# Patient Record
Sex: MALE | Race: WHITE
[De-identification: names, ages, dates, MRNs, and addresses within clinical notes are randomized per-mention and may not be internally consistent; named-entity substitution may affect disease eponyms.]

---

## 2017-11-08 PROBLEM — Z00.00 ENCOUNTER FOR PREVENTIVE HEALTH EXAMINATION: Status: ACTIVE | Noted: 2017-11-08

## 2017-11-09 ENCOUNTER — APPOINTMENT (OUTPATIENT)
Dept: RADIOLOGY | Facility: HOSPITAL | Age: 77
End: 2017-11-09
Payer: MEDICARE

## 2017-11-09 ENCOUNTER — OUTPATIENT (OUTPATIENT)
Dept: OUTPATIENT SERVICES | Facility: HOSPITAL | Age: 77
LOS: 1 days | End: 2017-11-09
Payer: MEDICARE

## 2017-11-09 DIAGNOSIS — K59.00 CONSTIPATION, UNSPECIFIED: ICD-10-CM

## 2017-11-09 PROCEDURE — 74000: CPT | Mod: 26

## 2017-11-09 PROCEDURE — 74018 RADEX ABDOMEN 1 VIEW: CPT

## 2018-05-03 ENCOUNTER — OUTPATIENT (OUTPATIENT)
Dept: OUTPATIENT SERVICES | Facility: HOSPITAL | Age: 78
LOS: 1 days | End: 2018-05-03

## 2018-05-03 ENCOUNTER — APPOINTMENT (OUTPATIENT)
Dept: RADIOLOGY | Facility: HOSPITAL | Age: 78
End: 2018-05-03
Payer: MEDICARE

## 2018-05-03 DIAGNOSIS — R13.10 DYSPHAGIA, UNSPECIFIED: ICD-10-CM

## 2018-05-03 PROCEDURE — 74241: CPT | Mod: 26

## 2018-10-10 ENCOUNTER — INPATIENT (INPATIENT)
Facility: HOSPITAL | Age: 78
LOS: 4 days | Discharge: NURSING, REHAB OR LONG-TERM CARE | End: 2018-10-15
Attending: HOSPITALIST | Admitting: HOSPITALIST
Payer: MEDICARE

## 2018-10-10 VITALS
SYSTOLIC BLOOD PRESSURE: 99 MMHG | RESPIRATION RATE: 16 BRPM | HEART RATE: 65 BPM | TEMPERATURE: 98 F | OXYGEN SATURATION: 100 % | DIASTOLIC BLOOD PRESSURE: 61 MMHG

## 2018-10-10 DIAGNOSIS — K63.9 DISEASE OF INTESTINE, UNSPECIFIED: Chronic | ICD-10-CM

## 2018-10-10 DIAGNOSIS — I95.9 HYPOTENSION, UNSPECIFIED: ICD-10-CM

## 2018-10-10 DIAGNOSIS — S06.5X9A TRAUMATIC SUBDURAL HEMORRHAGE WITH LOSS OF CONSCIOUSNESS OF UNSPECIFIED DURATION, INITIAL ENCOUNTER: ICD-10-CM

## 2018-10-10 DIAGNOSIS — M19.90 UNSPECIFIED OSTEOARTHRITIS, UNSPECIFIED SITE: ICD-10-CM

## 2018-10-10 DIAGNOSIS — Z29.9 ENCOUNTER FOR PROPHYLACTIC MEASURES, UNSPECIFIED: ICD-10-CM

## 2018-10-10 DIAGNOSIS — D72.829 ELEVATED WHITE BLOOD CELL COUNT, UNSPECIFIED: ICD-10-CM

## 2018-10-10 LAB
ALBUMIN SERPL ELPH-MCNC: 3.3 G/DL — SIGNIFICANT CHANGE UP (ref 3.3–5)
ALP SERPL-CCNC: 63 U/L — SIGNIFICANT CHANGE UP (ref 40–120)
ALT FLD-CCNC: 19 U/L — SIGNIFICANT CHANGE UP (ref 4–41)
APPEARANCE UR: CLEAR — SIGNIFICANT CHANGE UP
APTT BLD: 25.2 SEC — LOW (ref 27.5–37.4)
AST SERPL-CCNC: 19 U/L — SIGNIFICANT CHANGE UP (ref 4–40)
BACTERIA # UR AUTO: NEGATIVE — SIGNIFICANT CHANGE UP
BASE EXCESS BLDV CALC-SCNC: 3.6 MMOL/L — SIGNIFICANT CHANGE UP
BASOPHILS # BLD AUTO: 0.01 K/UL — SIGNIFICANT CHANGE UP (ref 0–0.2)
BASOPHILS NFR BLD AUTO: 0.1 % — SIGNIFICANT CHANGE UP (ref 0–2)
BILIRUB SERPL-MCNC: 0.6 MG/DL — SIGNIFICANT CHANGE UP (ref 0.2–1.2)
BILIRUB UR-MCNC: NEGATIVE — SIGNIFICANT CHANGE UP
BLOOD GAS VENOUS - CREATININE: 0.87 MG/DL — SIGNIFICANT CHANGE UP (ref 0.5–1.3)
BLOOD UR QL VISUAL: NEGATIVE — SIGNIFICANT CHANGE UP
BUN SERPL-MCNC: 48 MG/DL — HIGH (ref 7–23)
CALCIUM SERPL-MCNC: 8.4 MG/DL — SIGNIFICANT CHANGE UP (ref 8.4–10.5)
CHLORIDE BLDV-SCNC: 105 MMOL/L — SIGNIFICANT CHANGE UP (ref 96–108)
CHLORIDE SERPL-SCNC: 99 MMOL/L — SIGNIFICANT CHANGE UP (ref 98–107)
CO2 SERPL-SCNC: 25 MMOL/L — SIGNIFICANT CHANGE UP (ref 22–31)
COLOR SPEC: YELLOW — SIGNIFICANT CHANGE UP
CREAT SERPL-MCNC: 0.73 MG/DL — SIGNIFICANT CHANGE UP (ref 0.5–1.3)
EOSINOPHIL # BLD AUTO: 0.01 K/UL — SIGNIFICANT CHANGE UP (ref 0–0.5)
EOSINOPHIL NFR BLD AUTO: 0.1 % — SIGNIFICANT CHANGE UP (ref 0–6)
GAS PNL BLDV: 133 MMOL/L — LOW (ref 136–146)
GLUCOSE BLDV-MCNC: 108 — HIGH (ref 70–99)
GLUCOSE SERPL-MCNC: 108 MG/DL — HIGH (ref 70–99)
GLUCOSE UR-MCNC: NEGATIVE — SIGNIFICANT CHANGE UP
HCO3 BLDV-SCNC: 27 MMOL/L — SIGNIFICANT CHANGE UP (ref 20–27)
HCT VFR BLD CALC: 32.1 % — LOW (ref 39–50)
HCT VFR BLDV CALC: 34.8 % — LOW (ref 39–51)
HGB BLD-MCNC: 10.8 G/DL — LOW (ref 13–17)
HGB BLDV-MCNC: 11.3 G/DL — LOW (ref 13–17)
HYALINE CASTS # UR AUTO: NEGATIVE — SIGNIFICANT CHANGE UP
IMM GRANULOCYTES # BLD AUTO: 0.07 # — SIGNIFICANT CHANGE UP
IMM GRANULOCYTES NFR BLD AUTO: 0.7 % — SIGNIFICANT CHANGE UP (ref 0–1.5)
INR BLD: 1.03 — SIGNIFICANT CHANGE UP (ref 0.88–1.17)
KETONES UR-MCNC: SIGNIFICANT CHANGE UP
LACTATE BLDV-MCNC: 1.8 MMOL/L — SIGNIFICANT CHANGE UP (ref 0.5–2)
LEUKOCYTE ESTERASE UR-ACNC: NEGATIVE — SIGNIFICANT CHANGE UP
LYMPHOCYTES # BLD AUTO: 1.14 K/UL — SIGNIFICANT CHANGE UP (ref 1–3.3)
LYMPHOCYTES # BLD AUTO: 10.8 % — LOW (ref 13–44)
MCHC RBC-ENTMCNC: 32.8 PG — SIGNIFICANT CHANGE UP (ref 27–34)
MCHC RBC-ENTMCNC: 33.6 % — SIGNIFICANT CHANGE UP (ref 32–36)
MCV RBC AUTO: 97.6 FL — SIGNIFICANT CHANGE UP (ref 80–100)
MONOCYTES # BLD AUTO: 1.21 K/UL — HIGH (ref 0–0.9)
MONOCYTES NFR BLD AUTO: 11.5 % — SIGNIFICANT CHANGE UP (ref 2–14)
NEUTROPHILS # BLD AUTO: 8.08 K/UL — HIGH (ref 1.8–7.4)
NEUTROPHILS NFR BLD AUTO: 76.8 % — SIGNIFICANT CHANGE UP (ref 43–77)
NITRITE UR-MCNC: NEGATIVE — SIGNIFICANT CHANGE UP
NRBC # FLD: 0 — SIGNIFICANT CHANGE UP
PCO2 BLDV: 39 MMHG — LOW (ref 41–51)
PH BLDV: 7.46 PH — HIGH (ref 7.32–7.43)
PH UR: 6 — SIGNIFICANT CHANGE UP (ref 5–8)
PLATELET # BLD AUTO: 287 K/UL — SIGNIFICANT CHANGE UP (ref 150–400)
PMV BLD: 8.8 FL — SIGNIFICANT CHANGE UP (ref 7–13)
PO2 BLDV: 41 MMHG — HIGH (ref 35–40)
POTASSIUM BLDV-SCNC: 3.6 MMOL/L — SIGNIFICANT CHANGE UP (ref 3.4–4.5)
POTASSIUM SERPL-MCNC: 3.7 MMOL/L — SIGNIFICANT CHANGE UP (ref 3.5–5.3)
POTASSIUM SERPL-SCNC: 3.7 MMOL/L — SIGNIFICANT CHANGE UP (ref 3.5–5.3)
PROT SERPL-MCNC: 6.7 G/DL — SIGNIFICANT CHANGE UP (ref 6–8.3)
PROT UR-MCNC: 30 — SIGNIFICANT CHANGE UP
PROTHROM AB SERPL-ACNC: 11.4 SEC — SIGNIFICANT CHANGE UP (ref 9.8–13.1)
RBC # BLD: 3.29 M/UL — LOW (ref 4.2–5.8)
RBC # FLD: 13.6 % — SIGNIFICANT CHANGE UP (ref 10.3–14.5)
RBC CASTS # UR COMP ASSIST: SIGNIFICANT CHANGE UP (ref 0–?)
SAO2 % BLDV: 75 % — SIGNIFICANT CHANGE UP (ref 60–85)
SODIUM SERPL-SCNC: 135 MMOL/L — SIGNIFICANT CHANGE UP (ref 135–145)
SP GR SPEC: 1.04 — SIGNIFICANT CHANGE UP (ref 1–1.04)
SQUAMOUS # UR AUTO: SIGNIFICANT CHANGE UP
TROPONIN T, HIGH SENSITIVITY: 13 NG/L — SIGNIFICANT CHANGE UP (ref ?–14)
TROPONIN T, HIGH SENSITIVITY: 15 NG/L — SIGNIFICANT CHANGE UP (ref ?–14)
UROBILINOGEN FLD QL: SIGNIFICANT CHANGE UP
WBC # BLD: 10.52 K/UL — HIGH (ref 3.8–10.5)
WBC # FLD AUTO: 10.52 K/UL — HIGH (ref 3.8–10.5)
WBC UR QL: SIGNIFICANT CHANGE UP (ref 0–?)

## 2018-10-10 PROCEDURE — 70450 CT HEAD/BRAIN W/O DYE: CPT | Mod: 26

## 2018-10-10 PROCEDURE — 70450 CT HEAD/BRAIN W/O DYE: CPT | Mod: 26,77

## 2018-10-10 PROCEDURE — 72170 X-RAY EXAM OF PELVIS: CPT | Mod: 26

## 2018-10-10 PROCEDURE — 71046 X-RAY EXAM CHEST 2 VIEWS: CPT | Mod: 26

## 2018-10-10 PROCEDURE — 99223 1ST HOSP IP/OBS HIGH 75: CPT | Mod: AI

## 2018-10-10 PROCEDURE — 99223 1ST HOSP IP/OBS HIGH 75: CPT

## 2018-10-10 PROCEDURE — 99233 SBSQ HOSP IP/OBS HIGH 50: CPT

## 2018-10-10 RX ORDER — SODIUM CHLORIDE 9 MG/ML
1000 INJECTION INTRAMUSCULAR; INTRAVENOUS; SUBCUTANEOUS ONCE
Qty: 0 | Refills: 0 | Status: COMPLETED | OUTPATIENT
Start: 2018-10-10 | End: 2018-10-10

## 2018-10-10 RX ORDER — ACETAMINOPHEN 500 MG
2 TABLET ORAL
Qty: 0 | Refills: 0 | COMMUNITY

## 2018-10-10 RX ORDER — DOCUSATE SODIUM 100 MG
1 CAPSULE ORAL
Qty: 0 | Refills: 0 | COMMUNITY

## 2018-10-10 RX ORDER — INFLUENZA VIRUS VACCINE 15; 15; 15; 15 UG/.5ML; UG/.5ML; UG/.5ML; UG/.5ML
0.5 SUSPENSION INTRAMUSCULAR ONCE
Qty: 0 | Refills: 0 | Status: DISCONTINUED | OUTPATIENT
Start: 2018-10-10 | End: 2018-10-15

## 2018-10-10 RX ORDER — ACETAMINOPHEN 500 MG
650 TABLET ORAL EVERY 6 HOURS
Qty: 0 | Refills: 0 | Status: DISCONTINUED | OUTPATIENT
Start: 2018-10-10 | End: 2018-10-15

## 2018-10-10 RX ORDER — ALBUTEROL 90 UG/1
2 AEROSOL, METERED ORAL
Qty: 0 | Refills: 0 | COMMUNITY

## 2018-10-10 RX ADMIN — SODIUM CHLORIDE 1000 MILLILITER(S): 9 INJECTION INTRAMUSCULAR; INTRAVENOUS; SUBCUTANEOUS at 14:40

## 2018-10-10 RX ADMIN — SODIUM CHLORIDE 2000 MILLILITER(S): 9 INJECTION INTRAMUSCULAR; INTRAVENOUS; SUBCUTANEOUS at 11:19

## 2018-10-10 RX ADMIN — SODIUM CHLORIDE 1000 MILLILITER(S): 9 INJECTION INTRAMUSCULAR; INTRAVENOUS; SUBCUTANEOUS at 15:34

## 2018-10-10 NOTE — H&P ADULT - PROBLEM SELECTOR PLAN 1
CT with Acute subdural hematomas in posterior hemispheric region as well as right frontal region  Appreciate NeuroSurgery- no acute neurosurgical intervention  Advise  repeat CT head in 6 hours  continue neuro checks CT with Acute subdural hematomas in posterior hemispheric region as well as right frontal region  INR - 1.03, Platelets - 287   Appreciate NeuroSurgery- no acute neurosurgical intervention  Advise  repeat CT head in 6 hours  continue neuro checks  EKG reviewed- presence of interventricular conduction delay  EP consulted - DW EP NP - FU recommendations  Will obtain TTE

## 2018-10-10 NOTE — CONSULT NOTE ADULT - SUBJECTIVE AND OBJECTIVE BOX
HPI/CC: Asked to see this patient for abnormal ECG.  This is a 77 yo male with a PMH of arthritis per chart, on PRN albuterol who presents today for recurrent mechanical falls, reportedly per EMR notes.  The patient has somewhat of a flat affect and offers minimal detail to his fall, yet reports having come to the ED for "pain in the knees and legs".  He otherwise is able to provide his name, , year, and location.  He reports having a "cousin who lives in [place], and a roommate -- he's very nice".  He denies CP, dizziness, or syncope.    Per Neurosurgery, no surgical intervention for small SDH found on head CT.  Mild leukocytosis found on CBC; likely reactive per primary team.   ECG demonstrates NSR with an IVCD and QRS of 124ms. There are no other bradyarrhythmias on telemetry review.    PAST MEDICAL & SURGICAL HISTORY:  Arthritis  Disease of intestine, unspecified    PREVIOUS DIAGNOSTIC TESTING:    [ ] Echocardiogram:  [ ] Catheterization:  [ ] Stress Test:  	    MEDICATIONS:  acetaminophen   Tablet .. 650 milliGRAM(s) Oral every 6 hours PRN    FAMILY HISTORY:  No pertinent family history in first degree relatives    SOCIAL HISTORY:    [ ] Non-smoker  [ ] Smoker  [ ] Alcohol    Allergies  No Known Allergies    REVIEW OF SYSTEMS:  CONSTITUTIONAL: No fever, weight loss, or fatigue  EYES: No eye pain, visual disturbances, or discharge  ENMT:  No difficulty hearing, tinnitus, vertigo; No sinus or throat pain  NECK: No pain or stiffness  RESPIRATORY: No cough, wheezing, chills or hemoptysis; No Shortness of Breath  CARDIOVASCULAR: No chest pain, palpitations, passing out, dizziness, or leg swelling  GASTROINTESTINAL: No abdominal or epigastric pain. No nausea, vomiting, or hematemesis; No diarrhea or constipation. No melena or hematochezia.  GENITOURINARY: No dysuria, frequency, hematuria, or incontinence  NEUROLOGICAL: No headaches, memory loss, loss of strength, numbness, or tremors  SKIN: No itching, burning, rashes, or lesions   LYMPH Nodes: No enlarged glands  ENDOCRINE: No heat or cold intolerance; No hair loss  MUSCULOSKELETAL: +joint pain "knees and legs"; No muscle, back   PSYCHIATRIC: No depression, anxiety, mood swings, or difficulty sleeping  HEME/LYMPH: No easy bruising, or bleeding gums  ALLERGY AND IMMUNOLOGIC: No hives or eczema	    [ ] All others negative	  [ ] Unable to obtain    PHYSICAL EXAM:  T(C): 36.7 (10-10-18 @ 15:40), Max: 37.6 (10-10-18 @ 10:50)  HR: 76 (10-10-18 @ 15:40) (65 - 76)  BP: 95/60 (10-10-18 @ 15:40) (84/55 - 99/61)  RR: 18 (10-10-18 @ 15:40) (16 - 18)  SpO2: 100% (10-10-18 @ 15:40) (100% - 100%)  Wt(kg): --  I&O's Summary    Appearance: Flat affect, cachectic	  HEENT:   Normal oral mucosa, PERRL, EOMI	  Lymphatic: No lymphadenopathy  Cardiovascular: Normal S1 S2, No JVD, No murmurs, No edema  Respiratory: Lungs clear to auscultation	  Psychiatry: A & O x 3, Mood & affect flat  Gastrointestinal:  Soft, Non-tender, + BS	  Skin: No rashes, No ecchymoses, No cyanosis; Rt head lac	  Neurologic: Non-focal  Extremities: Normal range of motion, No clubbing, cyanosis or edema  Vascular: Peripheral pulses palpable 2+ bilaterally    TELEMETRY: NSR 70	    ECG: NSR 80 bpm, LAD, IVCD,  ms, QTc 470ms  RADIOLOGY: Head CT small +SDH  OTHER: CXR clear  	  LABS:	 	                        10.8   10.52 )-----------( 287      ( 10 Oct 2018 10:50 )             32.1     1010    135  |  99  |  48<H>  ----------------------------<  108<H>  3.7   |  25  |  0.73    Ca    8.4      10 Oct 2018 10:50    TPro  6.7  /  Alb  3.3  /  TBili  0.6  /  DBili  x   /  AST  19  /  ALT  19  /  AlkPhos  63  1010

## 2018-10-10 NOTE — H&P ADULT - PROBLEM SELECTOR PLAN 2
SBP in 80-90   Mentating well  Lactate 1.8  Pt not sure of his baseline BP   Assess response to Iv bolus  Will obtain orthostatic BP

## 2018-10-10 NOTE — CONSULT NOTE ADULT - ASSESSMENT
77 yo male with a PMH of arthritis per chart, on PRN albuterol who presents today for recurrent mechanical falls, reportedly per EMR notes now with small SDH found on head CT and an IVCD with a QRS of 124ms; no bradyarrhythmias or pauses on telemetry review:    - cont telemetry  - f/u 2D TTE  - no neurosurgical interventions; f/u repeat head CT  - when steady, check orthostatics  - will d/w EP attending 79 yo male with a PMH of arthritis per chart, on PRN albuterol who presents today for recurrent mechanical falls, reportedly per EMR notes now with small SDH found on head CT and an IVCD with a QRS of 124ms; no bradyarrhythmias or pauses on telemetry review:    - cont telemetry  - f/u 2D TTE  - no neurosurgical interventions; f/u repeat head CT  - when steady, check orthostatics  - will d/w EP attending- possible ILR

## 2018-10-10 NOTE — ED ADULT NURSE NOTE - OBJECTIVE STATEMENT
patient alert ox3 came in c/o fell last night while walking to the kitchen to take meds. " states he tripped and fell. denies LOC. bruise noted to left hand between thumb and 2 digit. laceration with hematoma to the scalp noted. denies use of any blood thinners. breathing even and unlabored. skin otherwise intact. ambulatory with a walker at home. awaiting re eval.

## 2018-10-10 NOTE — CONSULT NOTE ADULT - SUBJECTIVE AND OBJECTIVE BOX
RODERICKERNESTINE ZUÑIGA 78y ,Male    HPI:     78M brought in by EMS s/p fall ,pt was walking to kitchen last night to take medication when he tripped and fell, this morning aide noted blood on molding, laceration noted to right side of head dried up blood, denies n/v/d cp sob dizziness. CT head shows a mall extra axial area of high attenuation seen in the posterior interhemispheric region as well as along the left tentorial region. This is compatible with a small subdural hematoma. This subdural hematoma measures approximately 3.2 mm widest diameter.  Small area of extra-axial high attenuation seen involving the right frontal region which measures approximately 3 mm widest diameter. This is compatible with a small subdural hematoma as well.  Upon PE patient denies any HA, N/V, blurry vision, denies taking blood thinners.	    PAST MEDICAL & SURGICAL HISTORY:  none    MEDICATIONS  (STANDING):  sodium chloride 0.9% Bolus 1000 milliLiter(s) IV Bolus once    MEDICATIONS  (PRN):    Vital Signs Last 24 Hrs  T(C): 37.6 (10 Oct 2018 10:50), Max: 37.6 (10 Oct 2018 10:50)  T(F): 99.7 (10 Oct 2018 10:50), Max: 99.7 (10 Oct 2018 10:50)  HR: 74 (10 Oct 2018 10:50) (65 - 74)  BP: 85/57 (10 Oct 2018 14:00) (84/55 - 99/61)  BP(mean): --  RR: 18 (10 Oct 2018 14:00) (16 - 18)  SpO2: 100% (10 Oct 2018 14:00) (100% - 100%)    PE:  AA&0 x 3, CN 2-12 grossly intact, speach clear, follows commands, PERRL  right parietal abrasion/laceration w/ dried blood  Motor: strength : BUE/BLE 5/5  Sensory: intact to light touch  no drift    LABS:                        10.8   10.52 )-----------( 287      ( 10 Oct 2018 10:50 )             32.1     10-10    135  |  99  |  48<H>  ----------------------------<  108<H>  3.7   |  25  |  0.73    Ca    8.4      10 Oct 2018 10:50    TPro  6.7  /  Alb  3.3  /  TBili  0.6  /  DBili  x   /  AST  19  /  ALT  19  /  AlkPhos  63  10-10    PT/INR - ( 10 Oct 2018 10:50 )   PT: 11.4 SEC;   INR: 1.03     PTT - ( 10 Oct 2018 10:50 )  PTT:25.2 SEC RODERICKERNESTINE ZUÑIGA 78y ,Male    HPI:     78M brought in by EMS s/p fall ,pt was walking to kitchen last night to take medication when he tripped and fell, this morning aide noted blood on molding, laceration noted to right side of head dried up blood, denies n/v/d cp sob dizziness. CT head shows a small extra axial area of high attenuation seen in the posterior interhemispheric region as well as along the left tentorial region. This is compatible with a small subdural hematoma. This subdural hematoma measures approximately 3.2 mm widest diameter.  Small area of extra-axial high attenuation seen involving the right frontal region which measures approximately 3 mm widest diameter. This is compatible with a small subdural hematoma as well.  Upon PE patient denies any HA, N/V, blurry vision, denies taking blood thinners.	    PAST MEDICAL & SURGICAL HISTORY:  none    MEDICATIONS  (STANDING):  sodium chloride 0.9% Bolus 1000 milliLiter(s) IV Bolus once    MEDICATIONS  (PRN):    Vital Signs Last 24 Hrs  T(C): 37.6 (10 Oct 2018 10:50), Max: 37.6 (10 Oct 2018 10:50)  T(F): 99.7 (10 Oct 2018 10:50), Max: 99.7 (10 Oct 2018 10:50)  HR: 74 (10 Oct 2018 10:50) (65 - 74)  BP: 85/57 (10 Oct 2018 14:00) (84/55 - 99/61)  BP(mean): --  RR: 18 (10 Oct 2018 14:00) (16 - 18)  SpO2: 100% (10 Oct 2018 14:00) (100% - 100%)    PE:  AA&0 x 3, CN 2-12 grossly intact, speach clear, follows commands, PERRL  right parietal abrasion/laceration w/ dried blood  Motor: strength : BUE/BLE 5/5  Sensory: intact to light touch  no drift    LABS:                        10.8   10.52 )-----------( 287      ( 10 Oct 2018 10:50 )             32.1     10-10    135  |  99  |  48<H>  ----------------------------<  108<H>  3.7   |  25  |  0.73    Ca    8.4      10 Oct 2018 10:50    TPro  6.7  /  Alb  3.3  /  TBili  0.6  /  DBili  x   /  AST  19  /  ALT  19  /  AlkPhos  63  10-10    PT/INR - ( 10 Oct 2018 10:50 )   PT: 11.4 SEC;   INR: 1.03     PTT - ( 10 Oct 2018 10:50 )  PTT:25.2 SEC

## 2018-10-10 NOTE — ED PROVIDER NOTE - OBJECTIVE STATEMENT
78M rought in by EMS s/p fall pt was walking to kitchen last night to take medication when he tripped and fell, this morning aide noted blood on molding, laceration noted to right side of head dried up blood, denies n/v/d cp sob dizziness 78M w/ no significant pmhx presents to the ED BIBEMS by [facility] for eval of head injury. Pt is awake and alert, states he was walking to his kitchen last night around 8pm when he tripped and fell, striking his head against a molding. Pt states he fell again in the bathroom later on in the evening. Per HHA, pt seems somewhat confused, Pt w/ dried blood noted to the R scalp. Pt is not on any anticoagulants. Denies HA, blurred vision, dizziness, chest pain, sob, numbness or paresthesias of the extremitie, n/v, abd pain.

## 2018-10-10 NOTE — H&P ADULT - ASSESSMENT
78year old Male  with no significant PMH brought in by EMS s/p fall. 78year old Male  with no significant PMH brought in by EMS s/p fall. CT with subdural hematoma

## 2018-10-10 NOTE — ED ADULT TRIAGE NOTE - CHIEF COMPLAINT QUOTE
A&Ox3 brought in by EMS s/p fall pt was walking to kitchen last night to take medication when he tripped and fell, this morning aide noted blood on molding, laceration noted to right side of head dried up blood, denies n/v/d cp sob dizziness

## 2018-10-10 NOTE — H&P ADULT - HISTORY OF PRESENT ILLNESS
78year old Male  with no significant PMH brought in by EMS s/p fall.  Pt was walking to kitchen last night to take medication when he tripped and fell.  This AM, his aide noted blood on molding, laceration noted to right side of head dried up blood  Denies n/v/d cp sob dizziness.   Denies any HA, N/V, blurry vision, denies taking blood thinners    In the ED, vitals were 97.8, 74, SBP ranging from 80-90s, RR of 18, O2 of 100%. pt was given a 2 L bolus  CT head showed a small subdural hematoma, approximately 3.2 mm widest diameter and pt was evaluated by Neurosurgery 78year old Male  PMH of Arthritis  brought in by EMS s/p fall.  pt reports he was walking to kitchen last night to take  his medication when he tripped and fell.  He states he stumbled and fell 3 times yesterday   This AM, his aide noted blood on molding, laceration noted to right side of head dried up blood  Denies CP/ SOB/palpitations or lightheadedness   Denies n/v/d .   Denies headache, N/V, blurry vision  Denies LOC, seizure like activity  Similar fall 3 months back when he fell from the steps. He went to [facility] at that time   pt denies any significant PMH - only states he had intestinal surgery x 20 years back. does not know the details     In the ED, vitals were 97.8, 74, SBP ranging from 80-90s, RR of 18, O2 of 100%. pt was given a 2 L bolus  CT head showed a small subdural hematoma, approximately 3.2 mm widest diameter and pt was evaluated by Neurosurgery

## 2018-10-10 NOTE — ED ADULT NURSE NOTE - NSIMPLEMENTINTERV_GEN_ALL_ED
Implemented All Universal Safety Interventions:  Ernul to call system. Call bell, personal items and telephone within reach. Instruct patient to call for assistance. Room bathroom lighting operational. Non-slip footwear when patient is off stretcher. Physically safe environment: no spills, clutter or unnecessary equipment. Stretcher in lowest position, wheels locked, appropriate side rails in place.

## 2018-10-10 NOTE — ED PROVIDER NOTE - CPE EDP GASTRO NORM
Pt called and notified of below. No further questions or concerns at this time.  Patient transferred to imaging scheduling to make appointment for colposcopy.   normal...

## 2018-10-10 NOTE — ED PROVIDER NOTE - ATTENDING CONTRIBUTION TO CARE
77 yo male c/o 2 episodes of falling at 8 am and in bathroom later, unclear if first was syncopal event. According to aide, seems confused slightly. To [facility] for evaluation, laceration on occipital area. On no anticoagulants, platelet inhibitors, denies Chest pain  PE 99/61 P65 rr 18 t 97.9 HEENT occipital scalp laceration, chest scattered rhonchi cor distant RR and soft non tender, neuro non focal, Imp; r/o syncopal event, head trauma, lac.  sepsis rule out. Plan CT head , lac repair, IV NS hydration CXR U/A, lactate, troponin and admission.

## 2018-10-11 LAB
SPECIMEN SOURCE: SIGNIFICANT CHANGE UP
SPECIMEN SOURCE: SIGNIFICANT CHANGE UP

## 2018-10-11 PROCEDURE — 99232 SBSQ HOSP IP/OBS MODERATE 35: CPT

## 2018-10-11 PROCEDURE — 99233 SBSQ HOSP IP/OBS HIGH 50: CPT

## 2018-10-11 RX ADMIN — Medication 650 MILLIGRAM(S): at 11:36

## 2018-10-11 RX ADMIN — Medication 650 MILLIGRAM(S): at 11:06

## 2018-10-11 NOTE — CONSULT NOTE ADULT - SUBJECTIVE AND OBJECTIVE BOX
Elias Cm MD  Interventional Cardiology   [place] Office : [address] [place] [address] [place]. [address]  Tel:   [place] Office : [address] [facility] [place] [address]  Tel: [phone]  Cell : [phone]    HISTORY OF PRESENT ILLNESS:  78year old Male  PMH of Arthritis  brought in by EMS s/p fall.  pt reports he was walking to kitchen last night to take  his medication when he tripped and fell.  He states he stumbled and fell 3 times yesterday   This AM, his aide noted blood on molding, laceration noted to right side of head dried up blood  Denies CP/ SOB/palpitations or lightheadedness   Denies n/v/d .   Denies headache, N/V, blurry vision  Denies LOC, seizure like activity  Similar fall 3 months back when he fell from the steps. He went to [facility] at that time   pt denies any significant PMH - only states he had intestinal surgery x 20 years back. does not know the details     In the ED, vitals were 97.8, 74, SBP ranging from 80-90s, RR of 18, O2 of 100%. pt was given a 2 L bolus  CT head showed a small subdural hematoma, approximately 3.2 mm widest diameter and pt was evaluated by Neurosurgery   PAST MEDICAL & SURGICAL HISTORY:  Arthritis  Disease of intestine, unspecified    	    MEDICATIONS:        acetaminophen   Tablet .. 650 milliGRAM(s) Oral every 6 hours PRN        influenza   Vaccine 0.5 milliLiter(s) IntraMuscular once      FAMILY HISTORY:  No pertinent family history in first degree relatives        Allergies    No Known Allergies    Intolerances    	      PHYSICAL EXAM:  T(C): 36.5 (10-11-18 @ 06:59), Max: 37.6 (10-10-18 @ 10:50)  HR: 68 (10-11-18 @ 06:59) (67 - 76)  BP: 103/58 (10-11-18 @ 06:59) (84/55 - 104/62)  RR: 18 (10-11-18 @ 06:59) (17 - 18)  SpO2: 100% (10-11-18 @ 06:59) (100% - 100%)  Wt(kg): --  I&O's Summary      Appearance: Cachetic   HEENT:   Normal oral mucosa,  Cardiovascular: Normal S1 S2, No JVD, No murmurs, No edema  Respiratory: Coarse Ronchi B/L    Gastrointestinal:  Soft, Non-tender, + BS	  Extremities: Normal range of motion, No clubbing, cyanosis or edema    LABS:	 	    CARDIAC MARKERS:                                  10.8   10.52 )-----------( 287      ( 10 Oct 2018 10:50 )             32.1     10-10    135  |  99  |  48<H>  ----------------------------<  108<H>  3.7   |  25  |  0.73    Ca    8.4      10 Oct 2018 10:50    TPro  6.7  /  Alb  3.3  /  TBili  0.6  /  DBili  x   /  AST  19  /  ALT  19  /  AlkPhos  63  10-10    proBNP:   Lipid Profile:   HgA1c:   TSH:     ASSESSMENT/PLAN:

## 2018-10-11 NOTE — PROGRESS NOTE ADULT - SUBJECTIVE AND OBJECTIVE BOX
repeat CT head with stable small tentorial subdural. No neurosurgical intervention  Reconsult PRN.   No outpatient follow up needed.   liz Lantigua

## 2018-10-11 NOTE — CONSULT NOTE ADULT - ASSESSMENT
EKG SR , LAD,      Echo : Pending    Assessment and Plan:     S/P Fall C/B subdural Hematoma: Pt recalls the event likely mechanical, orthostatics negative,  neuro surg recs appreciated , no acute surgical intervention. Unlikely arrythemia, Eco pending     Borderline Hypotension : gentle fluid resuscitation, echo pending     DVT PPX heparin

## 2018-10-11 NOTE — PHYSICAL THERAPY INITIAL EVALUATION ADULT - ACTIVE RANGE OF MOTION EXAMINATION, REHAB EVAL
bilateral  lower extremity Active ROM was WFL (within functional limits)/except R shoulder movts with severe pain, hence NT SANJU Rowe made aware./bilateral upper extremity Active ROM was WFL (within functional limits)

## 2018-10-11 NOTE — PROVIDER CONTACT NOTE (OTHER) - ASSESSMENT
pt appeared to continuously swallow food without swallowing. when asked to swallow he stated " I cant"

## 2018-10-11 NOTE — PHYSICAL THERAPY INITIAL EVALUATION ADULT - PERTINENT HX OF CURRENT PROBLEM, REHAB EVAL
This is a 78 yr old Male  with no significant PMH brought in by EMS s/p fall. CT with subdural hematoma.

## 2018-10-11 NOTE — PROGRESS NOTE ADULT - PROBLEM SELECTOR PLAN 1
CT with Acute subdural hematomas in posterior hemispheric region as well as right frontal region, repeat CT head is stable.   Appreciate NeuroSurgery- no acute neurosurgical intervention  continue neuro checks  EP consulted - Will obtain TTE

## 2018-10-11 NOTE — PROGRESS NOTE ADULT - SUBJECTIVE AND OBJECTIVE BOX
Patient is a 78y old  Male who presents with a chief complaint of tentorial subdural (11 Oct 2018 08:23)      SUBJECTIVE / OVERNIGHT EVENTS:  Pt is a poor historian. conversant. appears comfortable. denied ha/vision change/cp    MEDICATIONS  (STANDING):  influenza   Vaccine 0.5 milliLiter(s) IntraMuscular once    MEDICATIONS  (PRN):  acetaminophen   Tablet .. 650 milliGRAM(s) Oral every 6 hours PRN Mild Pain (1 - 3), Moderate Pain (4 - 6)      T(C): 36.7 (10-11-18 @ 12:28), Max: 37.4 (10-10-18 @ 21:49)  HR: 66 (10-11-18 @ 12:28) (66 - 76)  BP: 90/58 (10-11-18 @ 12:28) (90/58 - 104/62)  RR: 16 (10-11-18 @ 12:28) (16 - 18)  SpO2: 97% (10-11-18 @ 12:28) (97% - 100%)  CAPILLARY BLOOD GLUCOSE        I&O's Summary      PHYSICAL EXAM:  GENERAL: NAD, thin built, disheveled   HEAD:  Atraumatic, Normocephalic  EYES: EOMI, PERRLA, conjunctiva and sclera clear  NECK: Supple, No JVD  CHEST/LUNG: non-labored in breathing; No wheeze  HEART: s1 s2, regular rhythm and rate   ABDOMEN: Soft, Nontender, Nondistended; Bowel sounds present  EXTREMITIES:  2+ Peripheral Pulses, No clubbing, cyanosis, or edema  PSYCH: awake, alert, calm   NEUROLOGY: non-focal  SKIN: No rashes or lesions    LABS:                        10.8   10.52 )-----------( 287      ( 10 Oct 2018 10:50 )             32.1     10-10    135  |  99  |  48<H>  ----------------------------<  108<H>  3.7   |  25  |  0.73    Ca    8.4      10 Oct 2018 10:50    TPro  6.7  /  Alb  3.3  /  TBili  0.6  /  DBili  x   /  AST  19  /  ALT  19  /  AlkPhos  63  10-10    PT/INR - ( 10 Oct 2018 10:50 )   PT: 11.4 SEC;   INR: 1.03          PTT - ( 10 Oct 2018 10:50 )  PTT:25.2 SEC      Urinalysis Basic - ( 10 Oct 2018 16:16 )    Color: YELLOW / Appearance: CLEAR / S.038 / pH: 6.0  Gluc: NEGATIVE / Ketone: SMALL  / Bili: NEGATIVE / Urobili: TRACE   Blood: NEGATIVE / Protein: 30 / Nitrite: NEGATIVE   Leuk Esterase: NEGATIVE / RBC: 3-5 / WBC 0-2   Sq Epi: OCC / Non Sq Epi: x / Bacteria: NEGATIVE        RADIOLOGY & ADDITIONAL TESTS:    Imaging Personally Reviewed: < from: CT Head No Cont (10.10.18 @ 21:02) >  IMPRESSION:    A small acute subdural hematoma is again noted in the posterior   interhemispheric and left tentorial locations, stable in size. The tiny   right anterior frontal subdural hematoma appears substantially decreased.    No new acute hemorrhages have occurred over the time interval.    < end of copied text >      Consultant(s) Notes Reviewed:      Care Discussed with Consultants/Other Providers:

## 2018-10-12 DIAGNOSIS — F43.20 ADJUSTMENT DISORDER, UNSPECIFIED: ICD-10-CM

## 2018-10-12 LAB
BACTERIA UR CULT: SIGNIFICANT CHANGE UP
BUN SERPL-MCNC: 16 MG/DL — SIGNIFICANT CHANGE UP (ref 7–23)
CALCIUM SERPL-MCNC: 8 MG/DL — LOW (ref 8.4–10.5)
CHLORIDE SERPL-SCNC: 103 MMOL/L — SIGNIFICANT CHANGE UP (ref 98–107)
CO2 SERPL-SCNC: 24 MMOL/L — SIGNIFICANT CHANGE UP (ref 22–31)
CREAT SERPL-MCNC: 0.57 MG/DL — SIGNIFICANT CHANGE UP (ref 0.5–1.3)
GLUCOSE SERPL-MCNC: 83 MG/DL — SIGNIFICANT CHANGE UP (ref 70–99)
HCT VFR BLD CALC: 31.2 % — LOW (ref 39–50)
HGB BLD-MCNC: 10.6 G/DL — LOW (ref 13–17)
MAGNESIUM SERPL-MCNC: 2.2 MG/DL — SIGNIFICANT CHANGE UP (ref 1.6–2.6)
MCHC RBC-ENTMCNC: 33.5 PG — SIGNIFICANT CHANGE UP (ref 27–34)
MCHC RBC-ENTMCNC: 34 % — SIGNIFICANT CHANGE UP (ref 32–36)
MCV RBC AUTO: 98.7 FL — SIGNIFICANT CHANGE UP (ref 80–100)
NRBC # FLD: 0 — SIGNIFICANT CHANGE UP
PLATELET # BLD AUTO: 275 K/UL — SIGNIFICANT CHANGE UP (ref 150–400)
PMV BLD: 9.6 FL — SIGNIFICANT CHANGE UP (ref 7–13)
POTASSIUM SERPL-MCNC: 3.6 MMOL/L — SIGNIFICANT CHANGE UP (ref 3.5–5.3)
POTASSIUM SERPL-SCNC: 3.6 MMOL/L — SIGNIFICANT CHANGE UP (ref 3.5–5.3)
RBC # BLD: 3.16 M/UL — LOW (ref 4.2–5.8)
RBC # FLD: 13.3 % — SIGNIFICANT CHANGE UP (ref 10.3–14.5)
SODIUM SERPL-SCNC: 139 MMOL/L — SIGNIFICANT CHANGE UP (ref 135–145)
SPECIMEN SOURCE: SIGNIFICANT CHANGE UP
WBC # BLD: 11.53 K/UL — HIGH (ref 3.8–10.5)
WBC # FLD AUTO: 11.53 K/UL — HIGH (ref 3.8–10.5)

## 2018-10-12 PROCEDURE — 90792 PSYCH DIAG EVAL W/MED SRVCS: CPT

## 2018-10-12 PROCEDURE — 99233 SBSQ HOSP IP/OBS HIGH 50: CPT

## 2018-10-12 RX ORDER — OLANZAPINE 15 MG/1
2.5 TABLET, FILM COATED ORAL EVERY 6 HOURS
Qty: 0 | Refills: 0 | Status: DISCONTINUED | OUTPATIENT
Start: 2018-10-12 | End: 2018-10-15

## 2018-10-12 RX ORDER — OLANZAPINE 15 MG/1
2 TABLET, FILM COATED ORAL EVERY 6 HOURS
Qty: 0 | Refills: 0 | Status: DISCONTINUED | OUTPATIENT
Start: 2018-10-12 | End: 2018-10-15

## 2018-10-12 NOTE — PROGRESS NOTE ADULT - SUBJECTIVE AND OBJECTIVE BOX
Elias Cm MD  Interventional Cardiology  [place] Office : [address] [place] [address] [place]. [address]  Tel:   [place] Office : [address] [facility] [place] [address]  Tel: [phone]  Cell : [phone]    Subjective : Pt lying in bed comfortable, not in distress,  	  MEDICATIONS:        acetaminophen   Tablet .. 650 milliGRAM(s) Oral every 6 hours PRN  OLANZapine 2.5 milliGRAM(s) Oral every 6 hours PRN  OLANZapine Injectable 2 milliGRAM(s) IntraMuscular every 6 hours PRN        influenza   Vaccine 0.5 milliLiter(s) IntraMuscular once      PHYSICAL EXAM:  T(C): 36.8 (10-12-18 @ 12:31), Max: 36.8 (10-12-18 @ 12:31)  HR: 72 (10-12-18 @ 12:31) (67 - 72)  BP: 110/73 (10-12-18 @ 12:31) (109/70 - 110/73)  RR: 18 (10-12-18 @ 12:31) (18 - 18)  SpO2: 95% (10-12-18 @ 12:31) (95% - 97%)  Wt(kg): --  I&O's Summary    11 Oct 2018 07:01  -  12 Oct 2018 07:00  --------------------------------------------------------  IN: 0 mL / OUT: 400 mL / NET: -400 mL          Appearance: disheveled 	  HEENT:   Normal oral mucosa,   Cardiovascular: Normal S1 S2, No JVD, No murmurs, No edema  Respiratory: coarse ronchi	  Gastrointestinal:  Soft, Non-tender, + BS	  Extremities:  No clubbing, cyanosis or edema                                    10.6   11.53 )-----------( 275      ( 12 Oct 2018 05:00 )             31.2     10-12    139  |  103  |  16  ----------------------------<  83  3.6   |  24  |  0.57    Ca    8.0<L>      12 Oct 2018 05:00  Mg     2.2     10-12      proBNP:   Lipid Profile:   HgA1c:   TSH:

## 2018-10-12 NOTE — BEHAVIORAL HEALTH ASSESSMENT NOTE - NSBHCONSULTFOLLOWDETAILS_PSY_A_CORE FT
[facility] Geriatric outpt. clinic: [phone]  [facility] outpt. walk in clinic : [phone] (9AM-7PM)  [facility] Outpatient clinic: [phone]

## 2018-10-12 NOTE — BEHAVIORAL HEALTH ASSESSMENT NOTE - RISK ASSESSMENT
Patient is at low risk of harm to self or others, as currently without active SI/HI. Risk factors include reported history of schizophrenia. Protective factors include residential stability, no access to means, no history of substance abuse.

## 2018-10-12 NOTE — SWALLOW BEDSIDE ASSESSMENT ADULT - COMMENTS
Patient seen upright in bed, cleared by nursing for swallowing evaluation.  Patient pleasantly confused and speech noted to be nonsensical at times.

## 2018-10-12 NOTE — BEHAVIORAL HEALTH ASSESSMENT NOTE - NSBHCONSULTMEDSEVERE_PSY_A_CORE FT
Zyprexa 2.5mg IM q6h PRN severe agitation, will not take PO Zyprexa 2.5mg IM q6h PRN severe agitation if qtc <500, will not take PO

## 2018-10-12 NOTE — BEHAVIORAL HEALTH ASSESSMENT NOTE - CASE SUMMARY
The patient is a 79yo man, resident of group home?, PMH of arthritis, reported history of schizophrenia, who presented s/p mechanical fall, found to have subdural hematoma. Psychiatry is consulted for medication management. Patient seen and evaluated, overall calm, AAOX2-3, guarded about his past psychiatric history, denies acute psychotic sxs, denies SI and HI, future oriented. Recommend meds. as written above, monitor EKG for qtc, collateral needed, phone numbers provided for PCP and HHA by the patient are incorrect. Case discussed with Dr. Castellano, will follow

## 2018-10-12 NOTE — BEHAVIORAL HEALTH ASSESSMENT NOTE - NSBHCHARTREVIEWIMAGING_PSY_A_CORE FT
< from: CT Head No Cont (10.10.18 @ 21:02) >    INTERPRETATION:  History: Intracranial hemorrhage follow-up. Subdural   hematoma.    Description: A noncontrast head CT was performed at 8:29 PM. Comparison   is made to a head CT study performed earlier the same day which was timed   at 12:21 PM.    Axial images with coronal and sagittal reformatted series were obtained.    A small acute subdural hematoma is again noted in the posterior   interhemispheric and left tentorial locations,stable in size. The tiny   right anterior frontal subdural hematoma appears substantially decreased.    No new acute hemorrhages have occurred over the time interval.    There is no evidence for acute infarct or brain parenchymal hemorrhage.   Chronic white matter changes and cerebral volume loss are again noted.    IMPRESSION:    A small acute subdural hematoma is again noted in the posterior   interhemispheric and left tentorial locations, stable in size. The tiny   right anterior frontal subdural hematoma appears substantially decreased.    No new acute hemorrhages have occurred over the time interval.

## 2018-10-12 NOTE — SWALLOW BEDSIDE ASSESSMENT ADULT - SWALLOW EVAL: DIAGNOSIS
1. Patient presented with mild oral stage dysphagia for regular solids marked by mild oral residue on lingual surface and right buccal cavity which was independently cleared with subsequent swallow or liquid wash and increased mastication time.   2. Patient with adequate oral stage for puree, mechanical soft solids, and thin liquids marked by adequate bolus formation, transfer, and clearance.  3. Patient presented with functional pharyngeal stage for puree, mechanical soft solids, solids, and thin liquids marked by prompt swallow trigger, adequate hyolaryngeal elevation upon digital palpation and no overt s/s of penetration/aspiration.

## 2018-10-12 NOTE — SWALLOW BEDSIDE ASSESSMENT ADULT - ASR SWALLOW ASPIRATION MONITOR
change of breathing pattern/gurgly voice/cough/fever/throat clearing/pneumonia/upper respiratory infection

## 2018-10-12 NOTE — BEHAVIORAL HEALTH ASSESSMENT NOTE - NSBHCHARTREVIEWVS_PSY_A_CORE FT
Vital Signs Last 24 Hrs  T(C): 36.8 (12 Oct 2018 12:31), Max: 37.3 (11 Oct 2018 19:45)  T(F): 98.2 (12 Oct 2018 12:31), Max: 99.1 (11 Oct 2018 19:45)  HR: 72 (12 Oct 2018 12:31) (67 - 72)  BP: 110/73 (12 Oct 2018 12:31) (98/61 - 110/73)  BP(mean): --  RR: 18 (12 Oct 2018 12:31) (18 - 18)  SpO2: 95% (12 Oct 2018 12:31) (95% - 97%)

## 2018-10-12 NOTE — BEHAVIORAL HEALTH ASSESSMENT NOTE - SUMMARY
The patient is a 79yo man, resident of group home?, PMH of arthritis, reported history of schizophrenia per sister contacted by the primary team, who presented s/p mechanical fall, found to have subdural hematoma. Psychiatry is consulted for medication management.    At this time, patient is alert, A&Ox2-3, preoccupation with content of TV programs but otherwise no active psychotic symptoms or negative symptoms, denies SI/HI. Will attempt to contact patient's sister for collateral. Phone numbers provided for PCP and HHA are incorrect.     Plan:  1. PRN- Zyprexa 2.5mg PO for agitation. Zyprexa IM for severe agitation. The patient is a 79yo man, resident of group home, PMH of arthritis, reported history of schizophrenia per sister contacted by the primary team, who presented s/p mechanical fall, found to have subdural hematoma. Psychiatry is consulted for medication management.    At this time, patient is alert, A&Ox2-3, preoccupation with content of TV programs but otherwise no active psychotic symptoms or negative symptoms, denies SI/HI. Will attempt to contact patient's sister for collateral. Phone numbers provided for PCP and HHA are incorrect.     Plan:  1. PRN- Zyprexa 2.5mg PO for mild agitation. PRN Zyprexa IM for severe agitation.

## 2018-10-12 NOTE — SWALLOW BEDSIDE ASSESSMENT ADULT - SWALLOW EVAL: RECOMMENDED FEEDING/EATING TECHNIQUES
alternate food with liquid/maintain upright posture during/after eating for 30 mins/position upright (90 degrees)/small sips/bites

## 2018-10-12 NOTE — BEHAVIORAL HEALTH ASSESSMENT NOTE - NSBHCHARTREVIEWINVESTIGATE_PSY_A_CORE FT
< from: 12 Lead ECG (10.10.18 @ 09:56) >      Ventricular Rate 80 BPM    Atrial Rate 80 BPM    P-R Interval 140 ms    QRS Duration 124 ms    Q-T Interval 408 ms    QTC Calculation(Bezet) 470 ms    P Axis 37 degrees    R Axis -33 degrees    T Axis -2 degrees    Diagnosis Line Normal sinus rhythm  Left axis deviation  Non-specific intra-ventricular conduction delay  Nonspecific ST abnormality  Abnormal ECG

## 2018-10-12 NOTE — BEHAVIORAL HEALTH ASSESSMENT NOTE - HPI (INCLUDE ILLNESS QUALITY, SEVERITY, DURATION, TIMING, CONTEXT, MODIFYING FACTORS, ASSOCIATED SIGNS AND SYMPTOMS)
The patient is a 79yo man, resident of group home?, [facility] of arthritis, reported history of schizophrenia per sister contacted by the primary team, who presented s/p mechanical fall, found to have subdural hematoma. Psychiatry is consulted for medication management.    Patient seen and evaluated. On interview, patient was reclining comfortably in bed, A&Ox2-3. Overall, patient is poorly engaged with interview, labile, irritable at times, with disorganized thought, poor speech articulation. Reports that he lives in a "private residence," has a roommate, gets help from a home health aide 3 days per week, came to the hospital via ambulance after falling at home. Initially reluctant to discuss any history of psychiatric treatment, then reports he was discharged from a psychiatric hospital in 1970. When asked about what he was previously treated for or any prior psychiatric diagnoses, patient states "illness," and refuses to elaborate saying, "I'm not telling you anything because I'm over it." Provides a similar response when asked about history of psychiatric medication use. Patient is tangential at times during interview, describes being upset by "sex programs on television," discusses "wanting to buy exercise equipment from [employer]." Additionally, appears preoccupied with being unable to write, becomes tearful as he talks about this subject. Denies SI/HI, AH/VH at this time. Also denies history of substance use. Able to provide phone numbers for PCP and HHA, though both numbers connect to voicemail for incorrect people. The patient is a 79yo man, resident of private home per patient (vs boarding house vs group home), resides with roommate, PMH of arthritis, reported history of schizophrenia, who presented s/p mechanical fall, found to have subdural hematoma. Psychiatry is consulted for medication management.    Patient seen and evaluated. On interview, patient was reclining comfortably in bed, A&Ox2-3. Overall, patient is poorly engaged with interview, labile, irritable at times, with somewhat disorganized thought process, poor speech articulation. Reports that he lives in a "private residence," has a roommate, gets help from a home health aide 3 days per week, came to the hospital via ambulance after falling at home. Initially reluctant to discuss any history of psychiatric treatment, then reports he was discharged from a psychiatric hospital in 1970. When asked about what he was previously treated for or any prior psychiatric diagnoses, patient states "illness," and refuses to elaborate saying, "I'm not telling you anything because I'm over it." Provides a similar response when asked about history of psychiatric medication use. Patient is tangential at times during interview, describes being upset by "sex programs on television," discusses "wanting to buy exercise equipment from [email or web address]." Additionally, appears preoccupied with being unable to write, becomes tearful as he talks about this subject. Denies SI/HI, AH/VH at this time. Also denies history of substance use. Able to provide phone numbers for PCP and HHA, though both numbers connect to voicemail for incorrect people. The patient is a 77yo man, resident of private home per patient (vs boarding house vs group home), resides with roommate, PMH of arthritis, reported history of schizophrenia, who presented s/p mechanical fall, found to have subdural hematoma. Psychiatry is consulted for medication management.    Patient seen and evaluated. On interview, patient was reclining comfortably in bed, A&Ox2-3. Overall, patient is poorly engaged with interview, labile, irritable at times, with somewhat disorganized thought process, poor speech articulation. Reports that he lives in a "private residence," has a roommate, gets help from a home health aide 3 days per week, came to the hospital via ambulance after falling at home. Initially reluctant to discuss any history of psychiatric treatment, then reports he was discharged from a psychiatric hospital in 1970. When asked about what he was previously treated for or any prior psychiatric diagnoses, patient states "illness," and refuses to elaborate saying, "I'm not telling you anything because I'm over it." Provides a similar response when asked about history of psychiatric medication use. Patient is tangential at times during interview, describes being upset by "sex programs on television," discusses "wanting to buy exercise equipment from [employer]." Additionally, appears preoccupied with being unable to write, becomes tearful as he talks about this subject. Denies SI/HI, AH/VH at this time. No delusions elicited. Also denies history of substance use. Able to provide phone numbers for PCP and HHA, though both numbers connect to voicemail for incorrect people.

## 2018-10-12 NOTE — BEHAVIORAL HEALTH ASSESSMENT NOTE - NSBHCHARTREVIEWLAB_PSY_A_CORE FT
10.6   11.53 )-----------( 275      ( 12 Oct 2018 05:00 )             31.2   10-12    139  |  103  |  16  ----------------------------<  83  3.6   |  24  |  0.57    Ca    8.0<L>      12 Oct 2018 05:00  Mg     2.2     10-12

## 2018-10-12 NOTE — PROGRESS NOTE ADULT - SUBJECTIVE AND OBJECTIVE BOX
Patient is a 78y old  Male who presents with a chief complaint of falls (11 Oct 2018 16:00)      SUBJECTIVE / OVERNIGHT EVENTS:  Pt feels well, denied headache/vision change/dizziness     MEDICATIONS  (STANDING):  influenza   Vaccine 0.5 milliLiter(s) IntraMuscular once    MEDICATIONS  (PRN):  acetaminophen   Tablet .. 650 milliGRAM(s) Oral every 6 hours PRN Mild Pain (1 - 3), Moderate Pain (4 - 6)      T(C): 36.8 (10-12-18 @ 12:31), Max: 37.3 (10-11-18 @ 19:45)  HR: 72 (10-12-18 @ 12:31) (67 - 72)  BP: 110/73 (10-12-18 @ 12:31) (98/61 - 110/73)  RR: 18 (10-12-18 @ 12:31) (18 - 18)  SpO2: 95% (10-12-18 @ 12:31) (95% - 97%)  CAPILLARY BLOOD GLUCOSE        I&O's Summary    11 Oct 2018 07:01  -  12 Oct 2018 07:00  --------------------------------------------------------  IN: 0 mL / OUT: 400 mL / NET: -400 mL        PHYSICAL EXAM:  GENERAL: NAD, thin built, disheveled   HEAD:  Atraumatic, Normocephalic  EYES: EOMI, PERRLA, conjunctiva and sclera clear  NECK: Supple, No JVD  CHEST/LUNG: non-labored in breathing; No wheeze  HEART: s1 s2, regular rhythm and rate   ABDOMEN: Soft, Nontender, distended; Bowel sounds present  EXTREMITIES:  2+ Peripheral Pulses, No clubbing, cyanosis, or edema  PSYCH: awake, alert, calm   NEUROLOGY: non-focal  SKIN: No rashes or lesions  LABS:                        10.6   11.53 )-----------( 275      ( 12 Oct 2018 05:00 )             31.2     10-12    139  |  103  |  16  ----------------------------<  83  3.6   |  24  |  0.57    Ca    8.0<L>      12 Oct 2018 05:00  Mg     2.2     10-12            Urinalysis Basic - ( 10 Oct 2018 16:16 )    Color: YELLOW / Appearance: CLEAR / S.038 / pH: 6.0  Gluc: NEGATIVE / Ketone: SMALL  / Bili: NEGATIVE / Urobili: TRACE   Blood: NEGATIVE / Protein: 30 / Nitrite: NEGATIVE   Leuk Esterase: NEGATIVE / RBC: 3-5 / WBC 0-2   Sq Epi: OCC / Non Sq Epi: x / Bacteria: NEGATIVE        RADIOLOGY & ADDITIONAL TESTS:    Imaging Personally Reviewed:    Consultant(s) Notes Reviewed:      Care Discussed with Consultants/Other Providers: Dr. Barnhart regarding pt's possible psych history

## 2018-10-13 LAB
BUN SERPL-MCNC: 15 MG/DL — SIGNIFICANT CHANGE UP (ref 7–23)
CALCIUM SERPL-MCNC: 8.1 MG/DL — LOW (ref 8.4–10.5)
CHLORIDE SERPL-SCNC: 103 MMOL/L — SIGNIFICANT CHANGE UP (ref 98–107)
CO2 SERPL-SCNC: 24 MMOL/L — SIGNIFICANT CHANGE UP (ref 22–31)
CREAT SERPL-MCNC: 0.62 MG/DL — SIGNIFICANT CHANGE UP (ref 0.5–1.3)
FOLATE SERPL-MCNC: 18.7 NG/ML — SIGNIFICANT CHANGE UP (ref 4.7–20)
GLUCOSE SERPL-MCNC: 105 MG/DL — HIGH (ref 70–99)
HCT VFR BLD CALC: 36.6 % — LOW (ref 39–50)
HGB BLD-MCNC: 12.2 G/DL — LOW (ref 13–17)
MAGNESIUM SERPL-MCNC: 2.3 MG/DL — SIGNIFICANT CHANGE UP (ref 1.6–2.6)
MCHC RBC-ENTMCNC: 32.7 PG — SIGNIFICANT CHANGE UP (ref 27–34)
MCHC RBC-ENTMCNC: 33.3 % — SIGNIFICANT CHANGE UP (ref 32–36)
MCV RBC AUTO: 98.1 FL — SIGNIFICANT CHANGE UP (ref 80–100)
NRBC # FLD: 0 — SIGNIFICANT CHANGE UP
PLATELET # BLD AUTO: 319 K/UL — SIGNIFICANT CHANGE UP (ref 150–400)
PMV BLD: 9.4 FL — SIGNIFICANT CHANGE UP (ref 7–13)
POTASSIUM SERPL-MCNC: 4 MMOL/L — SIGNIFICANT CHANGE UP (ref 3.5–5.3)
POTASSIUM SERPL-SCNC: 4 MMOL/L — SIGNIFICANT CHANGE UP (ref 3.5–5.3)
RBC # BLD: 3.73 M/UL — LOW (ref 4.2–5.8)
RBC # FLD: 13.3 % — SIGNIFICANT CHANGE UP (ref 10.3–14.5)
SODIUM SERPL-SCNC: 139 MMOL/L — SIGNIFICANT CHANGE UP (ref 135–145)
TSH SERPL-MCNC: 3.19 UIU/ML — SIGNIFICANT CHANGE UP (ref 0.27–4.2)
VIT B12 SERPL-MCNC: 1011 PG/ML — HIGH (ref 200–900)
WBC # BLD: 11.18 K/UL — HIGH (ref 3.8–10.5)
WBC # FLD AUTO: 11.18 K/UL — HIGH (ref 3.8–10.5)

## 2018-10-13 PROCEDURE — 99232 SBSQ HOSP IP/OBS MODERATE 35: CPT

## 2018-10-13 PROCEDURE — 93306 TTE W/DOPPLER COMPLETE: CPT | Mod: 26

## 2018-10-13 NOTE — PROGRESS NOTE ADULT - SUBJECTIVE AND OBJECTIVE BOX
Patient is a 78y old  Male who presents with a chief complaint of fall (12 Oct 2018 15:36)      SUBJECTIVE / OVERNIGHT EVENTS:  Pt feels well, denied ha/vision change/dizziness     MEDICATIONS  (STANDING):  influenza   Vaccine 0.5 milliLiter(s) IntraMuscular once    MEDICATIONS  (PRN):  acetaminophen   Tablet .. 650 milliGRAM(s) Oral every 6 hours PRN Mild Pain (1 - 3), Moderate Pain (4 - 6)  OLANZapine 2.5 milliGRAM(s) Oral every 6 hours PRN agitation  OLANZapine Injectable 2 milliGRAM(s) IntraMuscular every 6 hours PRN agitation      T(C): 36.6 (10-13-18 @ 13:14), Max: 36.6 (10-13-18 @ 13:14)  HR: 82 (10-13-18 @ 13:14) (72 - 86)  BP: 97/72 (10-13-18 @ 13:14) (97/72 - 104/74)  RR: 17 (10-13-18 @ 13:14) (17 - 17)  SpO2: 94% (10-13-18 @ 13:14) (94% - 96%)  CAPILLARY BLOOD GLUCOSE        I&O's Summary      PHYSICAL EXAM:  GENERAL: NAD, thin built, disheveled   HEAD:  Atraumatic, Normocephalic  EYES: EOMI, PERRLA, conjunctiva and sclera clear  NECK: Supple, No JVD  CHEST/LUNG: non-labored in breathing; No wheeze  HEART: s1 s2, regular rhythm and rate   ABDOMEN: Soft, Nontender, distended; Bowel sounds present  EXTREMITIES:  2+ Peripheral Pulses, No clubbing, cyanosis, or edema  PSYCH: awake, alert, oriented x 3, calm   NEUROLOGY: non-focal  SKIN: No rashes or lesions    LABS:                        12.2   11.18 )-----------( 319      ( 13 Oct 2018 05:17 )             36.6     10-13    139  |  103  |  15  ----------------------------<  105<H>  4.0   |  24  |  0.62    Ca    8.1<L>      13 Oct 2018 05:17  Mg     2.3     10-13                RADIOLOGY & ADDITIONAL TESTS:    Imaging Personally Reviewed: < from: Transthoracic Echocardiogram (10.13.18 @ 12:16) >  CONCLUSIONS:  1. Endocardium not well visualized; grossly normal left  ventricular systolic function. Septal motion consisteint  with conduction delay.  2. Normal right ventricular size and function.  3. Normal tricuspid valve. Minimal tricuspid regurgitation.  4. Estimated pulmonary artery systolic pressure equals 30  mm Hg, assuming right atrial pressure equals 10  mm Hg,  consistent with normal pulmonary pressures.    < end of copied text >      Consultant(s) Notes Reviewed:      Care Discussed with Consultants/Other Providers:

## 2018-10-13 NOTE — PROGRESS NOTE ADULT - SUBJECTIVE AND OBJECTIVE BOX
Elias Cm MD  Interventional Cardiology / Advance Heart Failure and Cardiac Transplant Specialist  [place] Office : [address] [place] [address]  Tel:   [place] Office : [address] [facility] [place]. [address]  Tel: [phone]  Cell : [phone]    Subjective : Pt lying in bed comfortable, not in distress, denies any chest pain or SOB  	  MEDICATIONS:  acetaminophen   Tablet .. 650 milliGRAM(s) Oral every 6 hours PRN  OLANZapine 2.5 milliGRAM(s) Oral every 6 hours PRN  OLANZapine Injectable 2 milliGRAM(s) IntraMuscular every 6 hours PRN  influenza   Vaccine 0.5 milliLiter(s) IntraMuscular once    PHYSICAL EXAM:  T(C): 36.6 (10-13-18 @ 13:14), Max: 36.6 (10-13-18 @ 13:14)  HR: 82 (10-13-18 @ 13:14) (72 - 86)  BP: 97/72 (10-13-18 @ 13:14) (97/72 - 104/74)  RR: 17 (10-13-18 @ 13:14) (17 - 17)  SpO2: 94% (10-13-18 @ 13:14) (94% - 96%)  Wt(kg): --  I&O's Summary        Appearance: Normal	  HEENT:   Normal oral mucosa, PERRL, EOMI	  Cardiovascular: Normal S1 S2, No JVD, No murmurs, No edema  Respiratory: Lungs clear to auscultation	  Gastrointestinal:  Soft, Non-tender, + BS	  Extremities: Normal range of motion, No clubbing, cyanosis or edema                                    12.2   11.18 )-----------( 319      ( 13 Oct 2018 05:17 )             36.6     10-13    139  |  103  |  15  ----------------------------<  105<H>  4.0   |  24  |  0.62    Ca    8.1<L>      13 Oct 2018 05:17  Mg     2.3     10-13      proBNP:   Lipid Profile:   HgA1c:   TSH: Thyroid Stimulating Hormone, Serum: 3.19 uIU/mL (10-13 @ 05:17)

## 2018-10-13 NOTE — PROGRESS NOTE ADULT - PROBLEM SELECTOR PLAN 1
CT with Acute subdural hematomas in posterior hemispheric region as well as right frontal region, repeat CT head is stable.   Appreciate NeuroSurgery- no acute neurosurgical intervention  continue neuro checks  EP consulted -  TTE 10/13: unremarkable

## 2018-10-13 NOTE — PROGRESS NOTE ADULT - ASSESSMENT
EKG SR , LAD,      Echo : Grossly normal LV function    Assessment and Plan:     S/P Fall C/B subdural Hematoma: Pt recalls the event likely mechanical, orthostatics negative,  neuro surg recs appreciated , no acute surgical intervention. Unlikely arrythemia, Echo shows normal LV function    Borderline Hypotension : gentle fluid resuscitation, echo normal

## 2018-10-14 LAB
BUN SERPL-MCNC: 17 MG/DL — SIGNIFICANT CHANGE UP (ref 7–23)
CALCIUM SERPL-MCNC: 8 MG/DL — LOW (ref 8.4–10.5)
CHLORIDE SERPL-SCNC: 102 MMOL/L — SIGNIFICANT CHANGE UP (ref 98–107)
CO2 SERPL-SCNC: 27 MMOL/L — SIGNIFICANT CHANGE UP (ref 22–31)
CREAT SERPL-MCNC: 0.61 MG/DL — SIGNIFICANT CHANGE UP (ref 0.5–1.3)
GLUCOSE SERPL-MCNC: 84 MG/DL — SIGNIFICANT CHANGE UP (ref 70–99)
HCT VFR BLD CALC: 34.3 % — LOW (ref 39–50)
HGB BLD-MCNC: 11.4 G/DL — LOW (ref 13–17)
MAGNESIUM SERPL-MCNC: 2.1 MG/DL — SIGNIFICANT CHANGE UP (ref 1.6–2.6)
MCHC RBC-ENTMCNC: 32.9 PG — SIGNIFICANT CHANGE UP (ref 27–34)
MCHC RBC-ENTMCNC: 33.2 % — SIGNIFICANT CHANGE UP (ref 32–36)
MCV RBC AUTO: 99.1 FL — SIGNIFICANT CHANGE UP (ref 80–100)
NRBC # FLD: 0 — SIGNIFICANT CHANGE UP
PLATELET # BLD AUTO: 315 K/UL — SIGNIFICANT CHANGE UP (ref 150–400)
PMV BLD: 9.6 FL — SIGNIFICANT CHANGE UP (ref 7–13)
POTASSIUM SERPL-MCNC: 3.8 MMOL/L — SIGNIFICANT CHANGE UP (ref 3.5–5.3)
POTASSIUM SERPL-SCNC: 3.8 MMOL/L — SIGNIFICANT CHANGE UP (ref 3.5–5.3)
RBC # BLD: 3.46 M/UL — LOW (ref 4.2–5.8)
RBC # FLD: 13.6 % — SIGNIFICANT CHANGE UP (ref 10.3–14.5)
SODIUM SERPL-SCNC: 139 MMOL/L — SIGNIFICANT CHANGE UP (ref 135–145)
WBC # BLD: 9.14 K/UL — SIGNIFICANT CHANGE UP (ref 3.8–10.5)
WBC # FLD AUTO: 9.14 K/UL — SIGNIFICANT CHANGE UP (ref 3.8–10.5)

## 2018-10-14 PROCEDURE — 99232 SBSQ HOSP IP/OBS MODERATE 35: CPT

## 2018-10-14 NOTE — PROGRESS NOTE ADULT - ATTENDING COMMENTS
I tried multiple phone numbers. Can't reach anyone. Will keep on trying to obtain collaterals.  f/u SW

## 2018-10-14 NOTE — PROGRESS NOTE ADULT - SUBJECTIVE AND OBJECTIVE BOX
Patient is a 78y old  Male who presents with a chief complaint of fall (13 Oct 2018 15:30)      SUBJECTIVE / OVERNIGHT EVENTS:  Pt feels well, no complaints. denied ha/dizziness/cp/palpitation. Tele SR    MEDICATIONS  (STANDING):  influenza   Vaccine 0.5 milliLiter(s) IntraMuscular once    MEDICATIONS  (PRN):  acetaminophen   Tablet .. 650 milliGRAM(s) Oral every 6 hours PRN Mild Pain (1 - 3), Moderate Pain (4 - 6)  OLANZapine 2.5 milliGRAM(s) Oral every 6 hours PRN agitation  OLANZapine Injectable 2 milliGRAM(s) IntraMuscular every 6 hours PRN agitation      T(C): 36.7 (10-14-18 @ 12:52), Max: 36.7 (10-14-18 @ 12:52)  HR: 78 (10-14-18 @ 12:52) (77 - 82)  BP: 99/68 (10-14-18 @ 12:52) (97/62 - 103/75)  RR: 18 (10-14-18 @ 12:52) (18 - 18)  SpO2: 96% (10-14-18 @ 12:52) (94% - 96%)  CAPILLARY BLOOD GLUCOSE        I&O's Summary      PHYSICAL EXAM:  GENERAL: NAD, thin built, disheveled   HEAD:  Atraumatic, Normocephalic  EYES: EOMI, PERRLA, conjunctiva and sclera clear  NECK: Supple, No JVD  CHEST/LUNG: non-labored in breathing, CTA; No wheeze  HEART: s1 s2, regular rhythm and rate   ABDOMEN: Soft, Nontender, distended; Bowel sounds present  EXTREMITIES:  2+ Peripheral Pulses, No clubbing, cyanosis, or edema  PSYCH: awake, alert, oriented x 3, calm   NEUROLOGY: non-focal  SKIN: No rashes or lesions    LABS:                        11.4   9.14  )-----------( 315      ( 14 Oct 2018 05:34 )             34.3     10-14    139  |  102  |  17  ----------------------------<  84  3.8   |  27  |  0.61    Ca    8.0<L>      14 Oct 2018 05:24  Mg     2.1     10-14                RADIOLOGY & ADDITIONAL TESTS:    Imaging Personally Reviewed:    Consultant(s) Notes Reviewed:      Care Discussed with Consultants/Other Providers:

## 2018-10-14 NOTE — PROGRESS NOTE ADULT - SUBJECTIVE AND OBJECTIVE BOX
Elias Cm MD  Interventional Cardiology / Advance Heart Failure and Cardiac Transplant Specialist  [place] Office : [address] [place] [address]  Tel:   [place] Office : [address] [facility] [place] [address]  Tel: [phone]  Cell : [phone]    Subjective : Pt lying in bed comfortable, not in distress, denies any chest pain or SOB  	  MEDICATIONS:        acetaminophen   Tablet .. 650 milliGRAM(s) Oral every 6 hours PRN  OLANZapine 2.5 milliGRAM(s) Oral every 6 hours PRN  OLANZapine Injectable 2 milliGRAM(s) IntraMuscular every 6 hours PRN        influenza   Vaccine 0.5 milliLiter(s) IntraMuscular once      PHYSICAL EXAM:  T(C): 36.7 (10-14-18 @ 12:52), Max: 36.7 (10-14-18 @ 12:52)  HR: 78 (10-14-18 @ 12:52) (77 - 82)  BP: 99/68 (10-14-18 @ 12:52) (97/62 - 103/75)  RR: 18 (10-14-18 @ 12:52) (18 - 18)  SpO2: 96% (10-14-18 @ 12:52) (94% - 96%)  Wt(kg): --  I&O's Summary        Appearance: Normal	  HEENT:   Normal oral mucosa, PERRL, EOMI	  Cardiovascular: Normal S1 S2, No JVD, No murmurs, No edema  Respiratory: Lungs clear to auscultation	  Gastrointestinal:  Soft, Non-tender, + BS	  Extremities: Normal range of motion, No clubbing, cyanosis or edema                                    11.4   9.14  )-----------( 315      ( 14 Oct 2018 05:34 )             34.3     10-14    139  |  102  |  17  ----------------------------<  84  3.8   |  27  |  0.61    Ca    8.0<L>      14 Oct 2018 05:24  Mg     2.1     10-14      proBNP:   Lipid Profile:   HgA1c:   TSH:

## 2018-10-15 ENCOUNTER — TRANSCRIPTION ENCOUNTER (OUTPATIENT)
Age: 78
End: 2018-10-15

## 2018-10-15 VITALS
HEART RATE: 74 BPM | RESPIRATION RATE: 16 BRPM | SYSTOLIC BLOOD PRESSURE: 112 MMHG | TEMPERATURE: 97 F | OXYGEN SATURATION: 96 % | DIASTOLIC BLOOD PRESSURE: 74 MMHG

## 2018-10-15 DIAGNOSIS — D64.9 ANEMIA, UNSPECIFIED: ICD-10-CM

## 2018-10-15 LAB
BACTERIA BLD CULT: SIGNIFICANT CHANGE UP
BACTERIA BLD CULT: SIGNIFICANT CHANGE UP
BUN SERPL-MCNC: 15 MG/DL — SIGNIFICANT CHANGE UP (ref 7–23)
CALCIUM SERPL-MCNC: 8 MG/DL — LOW (ref 8.4–10.5)
CHLORIDE SERPL-SCNC: 104 MMOL/L — SIGNIFICANT CHANGE UP (ref 98–107)
CO2 SERPL-SCNC: 20 MMOL/L — LOW (ref 22–31)
CREAT SERPL-MCNC: 0.48 MG/DL — LOW (ref 0.5–1.3)
GLUCOSE SERPL-MCNC: 79 MG/DL — SIGNIFICANT CHANGE UP (ref 70–99)
HCT VFR BLD CALC: 33.4 % — LOW (ref 39–50)
HGB BLD-MCNC: 11.2 G/DL — LOW (ref 13–17)
MAGNESIUM SERPL-MCNC: 2.2 MG/DL — SIGNIFICANT CHANGE UP (ref 1.6–2.6)
MCHC RBC-ENTMCNC: 32.6 PG — SIGNIFICANT CHANGE UP (ref 27–34)
MCHC RBC-ENTMCNC: 33.5 % — SIGNIFICANT CHANGE UP (ref 32–36)
MCV RBC AUTO: 97.1 FL — SIGNIFICANT CHANGE UP (ref 80–100)
NRBC # FLD: 0 — SIGNIFICANT CHANGE UP
PLATELET # BLD AUTO: 326 K/UL — SIGNIFICANT CHANGE UP (ref 150–400)
PMV BLD: 8.9 FL — SIGNIFICANT CHANGE UP (ref 7–13)
POTASSIUM SERPL-MCNC: 4.8 MMOL/L — SIGNIFICANT CHANGE UP (ref 3.5–5.3)
POTASSIUM SERPL-SCNC: 4.8 MMOL/L — SIGNIFICANT CHANGE UP (ref 3.5–5.3)
RBC # BLD: 3.44 M/UL — LOW (ref 4.2–5.8)
RBC # FLD: 13.7 % — SIGNIFICANT CHANGE UP (ref 10.3–14.5)
SODIUM SERPL-SCNC: 136 MMOL/L — SIGNIFICANT CHANGE UP (ref 135–145)
WBC # BLD: 10.26 K/UL — SIGNIFICANT CHANGE UP (ref 3.8–10.5)
WBC # FLD AUTO: 10.26 K/UL — SIGNIFICANT CHANGE UP (ref 3.8–10.5)

## 2018-10-15 PROCEDURE — 99239 HOSP IP/OBS DSCHRG MGMT >30: CPT

## 2018-10-15 RX ORDER — SODIUM CHLORIDE 0.65 %
2 AEROSOL, SPRAY (ML) NASAL
Qty: 0 | Refills: 0 | COMMUNITY

## 2018-10-15 NOTE — DISCHARGE NOTE ADULT - MEDICATION SUMMARY - MEDICATIONS TO TAKE
I will START or STAY ON the medications listed below when I get home from the hospital:    acetaminophen 325 mg oral tablet  -- 2 tab(s) by mouth 4 times a day  -- Indication: For pain    ProAir HFA 90 mcg/inh inhalation aerosol  -- 2 puff(s) inhaled 4 times a day, As Needed  -- Indication: For bronchial spasm    docusate sodium 100 mg oral capsule  -- 1 cap(s) by mouth 2 times a day  -- Indication: For constipation

## 2018-10-15 NOTE — DISCHARGE NOTE ADULT - PLAN OF CARE
To be asymptomatic, to reduce risks factors such as hypertension, diabetes and hyperlipidemia to lower the risk of blood clots formation; and to prevent complications of coronary artery disease such as worsening chest pain, heart attack and death. do not stop unless instructed by your physician.  Continue low salt, fat, cholesterol and carbohydrate diet. Follow up with cardiologist and primary care physician's routine appointment. Patient is stable, neurosurgery- no intervention, To help recover or improve as much as possible your sensory and motor abilities, to become more independent, and prevent future strokes. Continue physical therapy and skills learned for rehabilitation.  Follow up with your neurologist in 1-2 weeks for further medical management.  Continue to take your medications as prescribed, low salt, low fat, and diabetic diet.  Consider joining a support group for stroke survivors for emotional support to prevent depression. resolved, continue present care continue present care; follow up with Neuropsycho therapy once a week

## 2018-10-15 NOTE — PROGRESS NOTE ADULT - ASSESSMENT
Mr. Ferro is a 78 year old man with cognitive impairment but no significant PMH brought in by EMS s/p likely mechanical fall, found to have subdural hematoma on CT head. Repeat CT head stable and pt neurologically intact.

## 2018-10-15 NOTE — PROGRESS NOTE ADULT - SUBJECTIVE AND OBJECTIVE BOX
Patient is a 78y old  Male who presents with a chief complaint of Fall (15 Oct 2018 15:25)    SUBJECTIVE / OVERNIGHT EVENTS:  Patient seen and examined. Patient with no acute events overnight. Patient has no complaints. He ate breakfast and tolerated it well. No chest pain, SOB, or palpitations.  Reviewed telemetry - sinus rhythm, HR 60s-80s    MEDICATIONS  (STANDING):  influenza   Vaccine 0.5 milliLiter(s) IntraMuscular once    MEDICATIONS  (PRN):  acetaminophen   Tablet .. 650 milliGRAM(s) Oral every 6 hours PRN Mild Pain (1 - 3), Moderate Pain (4 - 6)  OLANZapine 2.5 milliGRAM(s) Oral every 6 hours PRN agitation  OLANZapine Injectable 2 milliGRAM(s) IntraMuscular every 6 hours PRN agitation      Vital Signs Last 24 Hrs  T(C): 36.2 (15 Oct 2018 12:58), Max: 36.3 (14 Oct 2018 21:31)  T(F): 97.2 (15 Oct 2018 12:58), Max: 97.4 (14 Oct 2018 21:31)  HR: 74 (15 Oct 2018 12:58) (72 - 74)  BP: 112/74 (15 Oct 2018 12:58) (112/74 - 117/72)  BP(mean): --  RR: 16 (15 Oct 2018 12:58) (16 - 18)  SpO2: 96% (15 Oct 2018 12:58) (96% - 98%)          I&O's Summary    15 Oct 2018 07:01  -  15 Oct 2018 18:35  --------------------------------------------------------  IN: 120 mL / OUT: 0 mL / NET: 120 mL          PHYSICAL EXAM  GENERAL: thin framed man with cognitive impairment in NAD  CHEST/LUNG: Clear to auscultation bilaterally; No wheeze  HEART: Regular rate and rhythm; No murmurs, rubs, or gallops  ABDOMEN: Soft, Nontender, Nondistended; Bowel sounds present. Palpable abdominal mesh  EXTREMITIES:  2+ Peripheral Pulses, No clubbing, cyanosis, or edema  PSYCH: AAOx3, cooperative, follows simple commands.       LABS:                        11.2   10.26 )-----------( 326      ( 15 Oct 2018 05:10 )             33.4     10-15    136  |  104  |  15  ----------------------------<  79  4.8   |  20<L>  |  0.48<L>    Ca    8.0<L>      15 Oct 2018 05:10  Mg     2.2     10-15          Consultant(s) Notes Reviewed:    Care Discussed with Consultants/Other Providers:

## 2018-10-15 NOTE — PROGRESS NOTE ADULT - PROBLEM SELECTOR PLAN 1
CT with Acute subdural hematomas in posterior hemispheric region as well as right frontal region, repeat CT head is stable.   Evaluated by NeuroSurgery- no acute neurosurgical intervention  continue neuro checks  EP consulted -  TTE 10/13: unremarkable

## 2018-10-15 NOTE — DISCHARGE NOTE ADULT - PATIENT PORTAL LINK FT
You can access the [facility] Patient Portal, offered by [facility], by registering with the following website: [email or web address]

## 2018-10-15 NOTE — PROGRESS NOTE ADULT - SUBJECTIVE AND OBJECTIVE BOX
Elias Cm MD  Interventional Cardiology  [place] Office : [address] [place] [address] [place]. [address]  Tel:   [place] Office : [address] [facility] [place] [address]  Tel: [phone]  Cell : [phone]    Subjective : Pt lying in bed comfortable, not in distress, denies any chest pain or SOB  	  MEDICATIONS:        acetaminophen   Tablet .. 650 milliGRAM(s) Oral every 6 hours PRN  OLANZapine 2.5 milliGRAM(s) Oral every 6 hours PRN  OLANZapine Injectable 2 milliGRAM(s) IntraMuscular every 6 hours PRN        influenza   Vaccine 0.5 milliLiter(s) IntraMuscular once      PHYSICAL EXAM:  T(C): 36.2 (10-15-18 @ 12:58), Max: 36.3 (10-14-18 @ 21:31)  HR: 74 (10-15-18 @ 12:58) (72 - 74)  BP: 112/74 (10-15-18 @ 12:58) (112/74 - 117/72)  RR: 16 (10-15-18 @ 12:58) (16 - 18)  SpO2: 96% (10-15-18 @ 12:58) (96% - 98%)  Wt(kg): --  I&O's Summary    15 Oct 2018 07:01  -  15 Oct 2018 14:15  --------------------------------------------------------  IN: 120 mL / OUT: 0 mL / NET: 120 mL            HEENT:   Normal oral mucosa,   Cardiovascular: Normal S1 S2, No JVD, No murmurs, No edema  Respiratory: Lungs clear to auscultation	  Gastrointestinal:  Soft, Non-tender, + BS	  Extremities:No clubbing, cyanosis or edema                                    11.2   10.26 )-----------( 326      ( 15 Oct 2018 05:10 )             33.4     10-15    136  |  104  |  15  ----------------------------<  79  4.8   |  20<L>  |  0.48<L>    Ca    8.0<L>      15 Oct 2018 05:10  Mg     2.2     10-15      proBNP:   Lipid Profile:   HgA1c:   TSH:

## 2018-10-15 NOTE — PROGRESS NOTE ADULT - PROBLEM SELECTOR PLAN 3
Likely reactive. Resolved. No clinical signs or symptoms of acute infection   Xray chest - clear lung  - no further w/u or intervention necessary

## 2018-10-15 NOTE — PROGRESS NOTE ADULT - PROBLEM SELECTOR PLAN 6
Hold AC  Evaluated by PT--> deemed candidate for rehab    DISPO: Patient medically stable to be discharged to rehab today. Spent 40 min coordinating discharge plan and counseling patient's first cousin/HCP, Irish, regarding his condition and care.

## 2018-10-15 NOTE — PROGRESS NOTE ADULT - PROBLEM SELECTOR PLAN 2
BP at acceptable range currently. Mentating well and asymptomatic.   likely his baseline BP   orthostatic BP neg

## 2018-10-15 NOTE — DISCHARGE NOTE ADULT - CARE PLAN
Principal Discharge DX:	Cardiac conduction disorder  Secondary Diagnosis:	Subdural hematoma  Secondary Diagnosis:	Leukocytosis Principal Discharge DX:	Cardiac conduction disorder  Goal:	To be asymptomatic, to reduce risks factors such as hypertension, diabetes and hyperlipidemia to lower the risk of blood clots formation; and to prevent complications of coronary artery disease such as worsening chest pain, heart attack and death.  Assessment and plan of treatment:	do not stop unless instructed by your physician.  Continue low salt, fat, cholesterol and carbohydrate diet. Follow up with cardiologist and primary care physician's routine appointment.  Secondary Diagnosis:	Subdural hematoma  Goal:	Patient is stable, neurosurgery- no intervention, To help recover or improve as much as possible your sensory and motor abilities, to become more independent, and prevent future strokes.  Assessment and plan of treatment:	Continue physical therapy and skills learned for rehabilitation.  Follow up with your neurologist in 1-2 weeks for further medical management.  Continue to take your medications as prescribed, low salt, low fat, and diabetic diet.  Consider joining a support group for stroke survivors for emotional support to prevent depression.  Secondary Diagnosis:	Leukocytosis  Goal:	resolved,  Secondary Diagnosis:	Adjustment disorder  Goal:	continue present care Principal Discharge DX:	Cardiac conduction disorder  Goal:	To be asymptomatic, to reduce risks factors such as hypertension, diabetes and hyperlipidemia to lower the risk of blood clots formation; and to prevent complications of coronary artery disease such as worsening chest pain, heart attack and death.  Assessment and plan of treatment:	do not stop unless instructed by your physician.  Continue low salt, fat, cholesterol and carbohydrate diet. Follow up with cardiologist and primary care physician's routine appointment.  Secondary Diagnosis:	Subdural hematoma  Goal:	Patient is stable, neurosurgery- no intervention, To help recover or improve as much as possible your sensory and motor abilities, to become more independent, and prevent future strokes.  Assessment and plan of treatment:	Continue physical therapy and skills learned for rehabilitation.  Follow up with your neurologist in 1-2 weeks for further medical management.  Continue to take your medications as prescribed, low salt, low fat, and diabetic diet.  Consider joining a support group for stroke survivors for emotional support to prevent depression.  Secondary Diagnosis:	Leukocytosis  Goal:	resolved,  Secondary Diagnosis:	Adjustment disorder  Goal:	continue present care; follow up with Neuropsycho therapy once a week

## 2018-10-15 NOTE — DISCHARGE NOTE ADULT - HOSPITAL COURSE
78year old Male  with no significant PMH brought in by EMS s/p fall. CT with subdural hematoma    Problem/Plan - 1:  ·  Problem: Subdural hematoma.  Plan: CT with Acute subdural hematomas in posterior hemispheric region as well as right frontal region, repeat CT head is stable.   Appreciate NeuroSurgery- no acute neurosurgical intervention  continue neuro checks  EP consulted -  TTE 10/13: unremarkable.   Hypotension.  Plan: SBP in 90 - 100  Mentating well  likely his baseline BP   orthostatic BP neg. Leukocytosis.  Plan: likely reactive, resolved todayLeukocytosis.  Plan: likely reactive, resolved todayno clinical s/s infection   Xray chest - clear lung.   Arthritis.  Plan: continue home med -Tylenol PRN.   Need for prophylactic measure.  Plan: Hold AC  PT consult rec rehab.   Patient is hemodynamically stable and without complaints and patient is ready for discharge.  Case discussed and medications reviewed with PMD.  Agreed with above.

## 2023-05-18 NOTE — PROGRESS NOTE ADULT - PROBLEM/PLAN-6
Pt calls stating Pre Authorization is needed for lidocaine (Lidoderm) 5 % patch. Please advise.    If any questions pt can be reached at HOME: [phone]    DISPLAY PLAN FREE TEXT

## 2024-06-18 ENCOUNTER — OFFICE (OUTPATIENT)
Dept: URBAN - METROPOLITAN AREA CLINIC 76 | Facility: CLINIC | Age: 84
Setting detail: OPHTHALMOLOGY
End: 2024-06-18

## 2024-06-18 DIAGNOSIS — H02.841: ICD-10-CM

## 2024-06-18 DIAGNOSIS — B02.39: ICD-10-CM

## 2024-06-18 PROCEDURE — MMC CONSUL MMC CONSULT: Performed by: OPHTHALMOLOGY

## 2024-06-20 PROBLEM — H02.841: Status: ACTIVE | Noted: 2024-06-18

## 2024-06-20 PROBLEM — B02.39 HERPES ZOSTER EYELID: Status: ACTIVE | Noted: 2024-06-18

## 2025-02-03 NOTE — PROGRESS NOTE ADULT - PROBLEM SELECTOR PROBLEM 4
Inpatient Hepatology Progress Note    Date: 2/3/2025    Referring Provider: Sterling Rai,*     CC/Reason for Consult: HCV cirrhosis, HCC    Subjective: Patient intubated. Unable to provide subjective.     HPI: Mr. 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     Patient presented to [facility] ED 2/1/25 after sustaining a fall. Found to be hypotensive s/p  3 L of IV fluids without response. Ultimately required vasopressors and central line placement. Labs notable for acute on chronic LFT elevation/transaminitis, ammonia 443, elevated LA, Leukocytosis, ARF with hyperkalemia s/p insulin, dextrose, calcium, and sodium bicarbonate. CT C/A/P with findings of pulmonary edema with increased bilateral septal thickening, increased small bilateral pleural effusions and loculated pleural fluid, moderate amount of abdominal/pelvic ascites, cirrhotic liver with suboptimal visualization of large infiltrative hepatic mass. Pateint was admitted to [facility].  Shortly after had high pressor requirements and became increasingly altered requiring intubation.    Hepatology consult due to h/o HCC and cirrhosis. At time of consult, patient is intubated and sedated.    Patient Active Problem List    Diagnosis Date Noted    Acute renal failure, unspecified acute renal failure type (CMD) 02/01/2025     Priority: Low    Malignant neoplasm of liver, unspecified liver malignancy type  (CMD) 01/23/2025     Priority: Low    HCC (hepatocellular carcinoma)  (CMD) 01/23/2025     Priority: Low    Cirrhosis of liver with ascites, unspecified hepatic cirrhosis type  (CMD) 01/18/2025     Priority: Low       Objective:  Vitals:    02/03/25 0914   BP: (!) 77/43   Pulse:    Resp:    Temp:      Physical  Exam:  General Appearance: Intubated/sedated, no distress, appears stated age.   Head: Normocephalic, without obvious abnormality, atraumatic.   Lungs: Intubated.   Heart: Regular rate and rhythm.  Abdomen:  abdomen firmly distended.  Extremities: 1-2+ LE edema  Skin: Skin color, texture and turgor normal, no rashes.   Neurologic: sedated. Does not follow commands.    Labs:  Reviewed    Imaging and other studies: reviewed    Assessment/Plan:  1. HCV Cirrhosis with ischemic hit. MELD 3.0 - 20  -- Patient reports treatment with Interferon in [place] many years ago.  -- Negative Quant 1/20/25.  -- Transaminitis 2/2 ischemic hepatitis. Primarily AST, improving.   - On NAC infusion, OK to DC as AST/ALT < 1000.  -- OLT Candidacy: not OLT candidate due to wide spread HCC.   - Palliative care consulted to discuss GOC    2. At risk for Hepatic Encephalopathy.  -- Patient currently intubated/sedated.  -- Ammonia increased to 433, likely multifactorial in setting of ARF and infectious concerns.   - Improved to 43 s/p PEG, now up to 625 again today.    - Continue bowel regimen. On lactulose and Golytely ordered.   -- Continue Rifaximin BID.    3. Portal HTN / Ascites.  -- LVP 1/19/25, negative for SBP by cell count.   -- Fluid from 1/23/25 with cell count negative for SBP.   - Pathology with occasional cells with a large cytoplasmic vacuole and peripherally located nucleus with no definite nuclear atypia.   -- Consider diagnostic paracentesis.  -- PTA Lasix 40 mg QD and Aldactone 100 mg QD, held.  -- 2 g Na diet when appropriate.    4. Esophageal Varices Screening.  -- EGD 1/24/24 with portal HTN gastropathy.    5. HCC.   --  on 1/20/25.  -- CT 4 Phase Liver 1/24/25 and MRI Liver 1/22/25 noting Widespread bilobar HCC, right lobe biliary ductal dilation.  - Reviewed at [facility] 1/29/25 with recommendations to continue f/u with Oncology for systemic therapy.    6. ARF with associated Hyperkalemia.    -- S/P insulin,  dextrose, calcium, and sodium bicarbonate.   -- SPA. Pressors.   -- On CVVH.  -- Management per Nephrology.    7. H/O Occluded PV, SMV and Splenic Vein.  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 No significant change from prior.  -- Typically no AC recommended in the setting of tumor thrombus, patient on eliquis PTA for MV thrombus.    8. H/O MVR and MV Thrombosis. PTA Eliquis, held. Cardiology following.    9. Distributive Shock   -- ?Infectious    - UA markedly abnormal, however, culture negative.    - Consider diagnostic paracentesis.    - On empiric zosyn and vancomycin.   -- Echo not consistent with obstructive cause from mitral disease per cardiology.   -- PV, SMV, and Splenic Vein thrombus unchanged on imaging, as above.   -- Pressors and stress steroids per critical care.    Lanny Ybarra PA-C  Abdominal Transplant & Hepatology  Pager [phone]  2/3/2025    After 3:30 PM please call the Transplant Hepatology clinic at [phone] and page the SARAH/Provider on call for the service.       Arthritis

## 2025-07-08 NOTE — ED ADULT NURSE NOTE - IN THE PAST 12 MONTHS HAVE YOU USED DRUGS OTHER THAN THOSE REQUIRED FOR MEDICAL REASON?
Scott called in to the surgery team because he has not been feeling well.      Reports on Wednesday 7/2 weight was 240, took Bumex 2 times that day  7/3- weight was 232 lbs so did not take any bumex  7/4- feeling fatigued, run down   7/5 woke up, went out side and the humidity took all my energy, had nausea and dizziness so he went to the ER.  There he stood up too fast, was disoriented and almost fell over.  They did a cardiac US and gave him fluid and zofran, which he reports did not help.  Per Scott he was told he was borderline dehydrated and there wasn't fluid buildup.  He did not get more fluids or nausea medications and left to go home.  He vomited several times on Saturday.   7/8 today he still feels nauseated and dizzy.  Weight is 234, states dry weight is 231.  Endorses swelling in his mid section (shirt fits differently).  Has taken no bumex since 7/3.  Endorses not eating or drinking much.  No other symptoms.      He was hoping that Cardiology had recommendations that would help him feel better.  He thinks he is dehydrated.        
No